# Patient Record
Sex: FEMALE | Race: BLACK OR AFRICAN AMERICAN | NOT HISPANIC OR LATINO | URBAN - METROPOLITAN AREA
[De-identification: names, ages, dates, MRNs, and addresses within clinical notes are randomized per-mention and may not be internally consistent; named-entity substitution may affect disease eponyms.]

---

## 2018-02-24 ENCOUNTER — EMERGENCY (EMERGENCY)
Facility: HOSPITAL | Age: 63
LOS: 1 days | Discharge: HOME | End: 2018-02-24
Attending: EMERGENCY MEDICINE | Admitting: EMERGENCY MEDICINE
Payer: SELF-PAY

## 2018-02-24 VITALS
DIASTOLIC BLOOD PRESSURE: 91 MMHG | OXYGEN SATURATION: 100 % | SYSTOLIC BLOOD PRESSURE: 211 MMHG | TEMPERATURE: 98 F | RESPIRATION RATE: 18 BRPM | HEART RATE: 73 BPM

## 2018-02-24 PROCEDURE — 99053 MED SERV 10PM-8AM 24 HR FAC: CPT

## 2018-02-24 PROCEDURE — 99283 EMERGENCY DEPT VISIT LOW MDM: CPT | Mod: 25

## 2018-02-24 RX ORDER — METOPROLOL TARTRATE 50 MG
5 TABLET ORAL ONCE
Qty: 0 | Refills: 0 | Status: DISCONTINUED | OUTPATIENT
Start: 2018-02-24 | End: 2018-02-24

## 2018-02-24 RX ORDER — LISINOPRIL 2.5 MG/1
1 TABLET ORAL
Qty: 22 | Refills: 0 | OUTPATIENT
Start: 2018-02-24 | End: 2018-03-17

## 2018-02-24 RX ORDER — GABAPENTIN 400 MG/1
3 CAPSULE ORAL
Qty: 189 | Refills: 0 | OUTPATIENT
Start: 2018-02-24 | End: 2018-03-16

## 2018-02-24 NOTE — ED PROVIDER NOTE - ATTENDING CONTRIBUTION TO CARE
J LUIS Attending Note - Dr. Rod  63y f w PMHx HTN, DM, and shingles last novemeber, p/w persistent superficial back pain present since she had her shingles rash.  PE: pt is alert and oriented, perrl, ent normal, membranes are moist, neck supple. no lymphadenopathy or thyroid enlargement, No JVD.  Chest clear to P&A, Heart- reg rhythm without murmur, rubs or gallops, radial pulses equal bilaterally.  Abd is soft, non-tender, Bowel sounds are active. no mass or organomegaly. : No CVA tenderness. Neuro:  Pt alert and oriented x 3. Perrl    Distal neurosensory is intact. Motor function is 5/5 strength bilaterally.  No focal deficits. Extremities:  No edema.  Skin: warm and dry with increased sensitivity along right mid back where daughter confirms patient had her zoster rash.  Impression:  Post herpetic neuropathy  Plan: labs analgesia, lidocaine patch, Gabapentin

## 2018-02-24 NOTE — ED ADULT NURSE NOTE - OBJECTIVE STATEMENT
Pt 63y female,aaox4 and amb, pt presents to ED c/o right sided back pain and high BP. Pt has home bp machine and states bp was 214/99 at home. Pt had shingle back in Nov 2017 and since then has been having right sided back pain. BP currently 208/91.

## 2018-02-24 NOTE — ED ADULT NURSE NOTE - CHIEF COMPLAINT QUOTE
Pt presents with c/o right upper back pain which started this AM. Denies any recent trauma or injury. Pain worsens with movement. In addition, pt complains of being hypertensive "My doctor always told me that my blood pressure was high but I don't know what it is".

## 2018-02-24 NOTE — ED PROVIDER NOTE - OBJECTIVE STATEMENT
This pt is a 63y f w PMHx HTN, DM, and shingles last novemeber, p/w persistent superficial back pain present since she had her shingles rash. This pain is present over her R upper back, over the same distribution where she had her rash. The pain is non positional in nature, non reproducible by palpation. No midline or bony tenderness. There is no rash present at this time. No recent f/c/n/v/d. She also reports high blood pressure as she has not refilled her lisonpril ppx for the last 3 months.

## 2018-02-24 NOTE — ED ADULT TRIAGE NOTE - CHIEF COMPLAINT QUOTE
Pt presents with c/o right upper back pain which started this AM. In addition, pt complains of being hypertensive "My doctor always told me that my blood pressure was high but I don't know what it is". Pt presents with c/o right upper back pain which started this AM. Denies any recent trauma or injury. Pain worsens with movement. In addition, pt complains of being hypertensive "My doctor always told me that my blood pressure was high but I don't know what it is".

## 2018-02-24 NOTE — ED PROVIDER NOTE - MEDICAL DECISION MAKING DETAILS
63y f w PMHx HTN, DM, and shingles last novemeber, p/w persistent superficial back pain present since she had her shingles rash. This pain is present over her R upper back, over the same distribution where she had her rash. The pain is non positional in nature, non reproducible by palpation. No midline or bony tenderness. There is no rash present at this time. No recent f/c/n/v/d. High susp for post herpetic neuralgia, will start gabapentin and write ppx, will also send refill ppx for lisinopril -apryl

## 2018-02-25 VITALS
OXYGEN SATURATION: 98 % | HEART RATE: 55 BPM | SYSTOLIC BLOOD PRESSURE: 188 MMHG | DIASTOLIC BLOOD PRESSURE: 88 MMHG | RESPIRATION RATE: 16 BRPM

## 2018-02-25 RX ORDER — METOPROLOL TARTRATE 50 MG
25 TABLET ORAL ONCE
Qty: 0 | Refills: 0 | Status: COMPLETED | OUTPATIENT
Start: 2018-02-24 | End: 2018-02-24

## 2018-02-25 RX ORDER — GABAPENTIN 400 MG/1
100 CAPSULE ORAL ONCE
Qty: 0 | Refills: 0 | Status: COMPLETED | OUTPATIENT
Start: 2018-02-25 | End: 2018-02-25

## 2018-02-25 RX ORDER — KETOROLAC TROMETHAMINE 30 MG/ML
30 SYRINGE (ML) INJECTION ONCE
Qty: 0 | Refills: 0 | Status: DISCONTINUED | OUTPATIENT
Start: 2018-02-25 | End: 2018-02-25

## 2018-02-25 RX ADMIN — GABAPENTIN 100 MILLIGRAM(S): 400 CAPSULE ORAL at 02:17

## 2018-02-25 RX ADMIN — Medication 25 MILLIGRAM(S): at 00:07

## 2021-01-28 NOTE — ED ADULT NURSE NOTE - CCCP TRG CHIEF CMPLNT
Group Topic: BH Skills Training     Date: 1/28/2021  Start Time: 1600  End Time: 1700  Facilitators: MELISSA Yost    Focus: Social Skills  Number in attendance: 4    Method: Group  Attendance: Present  Participation: Active  Patient Response: Appropriate feedback, Attentive and Interactive  Mood: \"7/10\"  Affect: Type: Euthymic (normal mood)   Range: brightens with engagement   Congruency: Congruent   Stability: Stable  Behavior/Socialization: Appropriate to group, Cooperative and Engaged  Thought Process: Focused  Task Performance: Follows directions  Patient Evaluation: Independent - full participation   back pain / htn